# Patient Record
Sex: FEMALE | Race: WHITE
[De-identification: names, ages, dates, MRNs, and addresses within clinical notes are randomized per-mention and may not be internally consistent; named-entity substitution may affect disease eponyms.]

---

## 2020-12-09 ENCOUNTER — TRANSCRIPTION ENCOUNTER (OUTPATIENT)
Age: 75
End: 2020-12-09

## 2022-05-27 ENCOUNTER — NON-APPOINTMENT (OUTPATIENT)
Age: 77
End: 2022-05-27

## 2022-10-03 ENCOUNTER — NON-APPOINTMENT (OUTPATIENT)
Age: 77
End: 2022-10-03

## 2023-03-14 ENCOUNTER — NON-APPOINTMENT (OUTPATIENT)
Age: 78
End: 2023-03-14

## 2023-10-04 ENCOUNTER — NON-APPOINTMENT (OUTPATIENT)
Age: 78
End: 2023-10-04

## 2024-11-06 ENCOUNTER — APPOINTMENT (OUTPATIENT)
Dept: ELECTROPHYSIOLOGY | Facility: CLINIC | Age: 79
End: 2024-11-06
Payer: MEDICARE

## 2024-11-06 VITALS
WEIGHT: 226 LBS | HEIGHT: 68 IN | SYSTOLIC BLOOD PRESSURE: 140 MMHG | HEART RATE: 78 BPM | DIASTOLIC BLOOD PRESSURE: 82 MMHG | BODY MASS INDEX: 34.25 KG/M2

## 2024-11-06 DIAGNOSIS — I48.19 OTHER PERSISTENT ATRIAL FIBRILLATION: ICD-10-CM

## 2024-11-06 PROCEDURE — 93000 ELECTROCARDIOGRAM COMPLETE: CPT

## 2024-11-06 PROCEDURE — 99205 OFFICE O/P NEW HI 60 MIN: CPT

## 2024-11-23 ENCOUNTER — NON-APPOINTMENT (OUTPATIENT)
Age: 79
End: 2024-11-23

## 2024-12-14 ENCOUNTER — NON-APPOINTMENT (OUTPATIENT)
Age: 79
End: 2024-12-14

## 2024-12-16 ENCOUNTER — NON-APPOINTMENT (OUTPATIENT)
Age: 79
End: 2024-12-16

## 2025-01-07 ENCOUNTER — RESULT REVIEW (OUTPATIENT)
Age: 80
End: 2025-01-07

## 2025-01-07 ENCOUNTER — TRANSCRIPTION ENCOUNTER (OUTPATIENT)
Age: 80
End: 2025-01-07

## 2025-01-07 ENCOUNTER — OUTPATIENT (OUTPATIENT)
Dept: OUTPATIENT SERVICES | Facility: HOSPITAL | Age: 80
LOS: 1 days | Discharge: HOME | End: 2025-01-07
Payer: MEDICARE

## 2025-01-07 VITALS
HEART RATE: 75 BPM | OXYGEN SATURATION: 98 % | DIASTOLIC BLOOD PRESSURE: 87 MMHG | SYSTOLIC BLOOD PRESSURE: 160 MMHG | RESPIRATION RATE: 16 BRPM

## 2025-01-07 VITALS
TEMPERATURE: 98 F | DIASTOLIC BLOOD PRESSURE: 103 MMHG | HEART RATE: 100 BPM | WEIGHT: 218.04 LBS | SYSTOLIC BLOOD PRESSURE: 162 MMHG | HEIGHT: 67 IN | RESPIRATION RATE: 16 BRPM | OXYGEN SATURATION: 99 %

## 2025-01-07 DIAGNOSIS — I48.20 CHRONIC ATRIAL FIBRILLATION, UNSPECIFIED: ICD-10-CM

## 2025-01-07 DIAGNOSIS — I48.91 UNSPECIFIED ATRIAL FIBRILLATION: ICD-10-CM

## 2025-01-07 DIAGNOSIS — Z98.890 OTHER SPECIFIED POSTPROCEDURAL STATES: Chronic | ICD-10-CM

## 2025-01-07 LAB
ANION GAP SERPL CALC-SCNC: 11 MMOL/L — SIGNIFICANT CHANGE UP (ref 5–17)
BUN SERPL-MCNC: 13.8 MG/DL — SIGNIFICANT CHANGE UP (ref 8–20)
CALCIUM SERPL-MCNC: 9.8 MG/DL — SIGNIFICANT CHANGE UP (ref 8.4–10.5)
CHLORIDE SERPL-SCNC: 105 MMOL/L — SIGNIFICANT CHANGE UP (ref 96–108)
CO2 SERPL-SCNC: 24 MMOL/L — SIGNIFICANT CHANGE UP (ref 22–29)
CREAT SERPL-MCNC: 0.62 MG/DL — SIGNIFICANT CHANGE UP (ref 0.5–1.3)
EGFR: 91 ML/MIN/1.73M2 — SIGNIFICANT CHANGE UP
GLUCOSE SERPL-MCNC: 108 MG/DL — HIGH (ref 70–99)
HCT VFR BLD CALC: 44.2 % — SIGNIFICANT CHANGE UP (ref 34.5–45)
HGB BLD-MCNC: 14.7 G/DL — SIGNIFICANT CHANGE UP (ref 11.5–15.5)
MAGNESIUM SERPL-MCNC: 2.3 MG/DL — SIGNIFICANT CHANGE UP (ref 1.6–2.6)
MCHC RBC-ENTMCNC: 29.9 PG — SIGNIFICANT CHANGE UP (ref 27–34)
MCHC RBC-ENTMCNC: 33.3 G/DL — SIGNIFICANT CHANGE UP (ref 32–36)
MCV RBC AUTO: 90 FL — SIGNIFICANT CHANGE UP (ref 80–100)
PLATELET # BLD AUTO: 247 K/UL — SIGNIFICANT CHANGE UP (ref 150–400)
POTASSIUM SERPL-MCNC: 4.4 MMOL/L — SIGNIFICANT CHANGE UP (ref 3.5–5.3)
POTASSIUM SERPL-SCNC: 4.4 MMOL/L — SIGNIFICANT CHANGE UP (ref 3.5–5.3)
RBC # BLD: 4.91 M/UL — SIGNIFICANT CHANGE UP (ref 3.8–5.2)
RBC # FLD: 14.4 % — SIGNIFICANT CHANGE UP (ref 10.3–14.5)
SODIUM SERPL-SCNC: 140 MMOL/L — SIGNIFICANT CHANGE UP (ref 135–145)
WBC # BLD: 9.49 K/UL — SIGNIFICANT CHANGE UP (ref 3.8–10.5)
WBC # FLD AUTO: 9.49 K/UL — SIGNIFICANT CHANGE UP (ref 3.8–10.5)

## 2025-01-07 PROCEDURE — C8925: CPT

## 2025-01-07 PROCEDURE — 92960 CARDIOVERSION ELECTRIC EXT: CPT

## 2025-01-07 PROCEDURE — 99152 MOD SED SAME PHYS/QHP 5/>YRS: CPT

## 2025-01-07 PROCEDURE — 93320 DOPPLER ECHO COMPLETE: CPT

## 2025-01-07 PROCEDURE — 80048 BASIC METABOLIC PNL TOTAL CA: CPT

## 2025-01-07 PROCEDURE — 83735 ASSAY OF MAGNESIUM: CPT

## 2025-01-07 PROCEDURE — 93010 ELECTROCARDIOGRAM REPORT: CPT | Mod: 76

## 2025-01-07 PROCEDURE — 93325 DOPPLER ECHO COLOR FLOW MAPG: CPT | Mod: 26

## 2025-01-07 PROCEDURE — 36415 COLL VENOUS BLD VENIPUNCTURE: CPT

## 2025-01-07 PROCEDURE — 85027 COMPLETE CBC AUTOMATED: CPT

## 2025-01-07 PROCEDURE — 93320 DOPPLER ECHO COMPLETE: CPT | Mod: 26

## 2025-01-07 PROCEDURE — 93325 DOPPLER ECHO COLOR FLOW MAPG: CPT

## 2025-01-07 PROCEDURE — 93312 ECHO TRANSESOPHAGEAL: CPT | Mod: 26

## 2025-01-07 PROCEDURE — 93005 ELECTROCARDIOGRAM TRACING: CPT

## 2025-01-07 RX ORDER — DILTIAZEM HYDROCHLORIDE 300 MG/1
1 CAPSULE, COATED, EXTENDED RELEASE ORAL
Refills: 0 | DISCHARGE

## 2025-01-07 RX ORDER — METOPROLOL TARTRATE 50 MG
1 TABLET ORAL
Refills: 0 | DISCHARGE

## 2025-01-07 RX ORDER — RIVAROXABAN 2.5 MG/1
1 TABLET, FILM COATED ORAL
Refills: 0 | DISCHARGE

## 2025-01-07 RX ORDER — ALPRAZOLAM 0.25 MG/1
1 TABLET ORAL
Refills: 0 | DISCHARGE

## 2025-01-07 NOTE — H&P PST ADULT - ASSESSMENT
79 year old female with HTN, hyperlipidemia, obesity BMI 34, osteoporosis, anxiety, and atrial fibrillation. She has had persistent AF with rapid rates, which has resulted in recent symptoms including exertional dyspnea with minimal physical exertion, and fatigue. She presents today for elective WESTON guided cardioversion as an initial strategy.     Confirms NPO > 8 hrs. Last Xarelto 20mg 1/6/24 PM     - iv heplock  - stat labs, ECG  - consent randell/ MD

## 2025-01-07 NOTE — DISCHARGE NOTE PROVIDER - NSDCCPTREATMENT_GEN_ALL_CORE_FT
PRINCIPAL PROCEDURE  Procedure: Echocardiogram, transesophageal, with external cardioversion  Findings and Treatment: -Take each dose of your anticoagulation medication (blood thinner) exactly as prescribed.   - Do not drive, operate heavy machinery or make important decisions for 24 hours following the procedure.  - You may resume all other activities the day after the procedure.  Call your doctor if:   - your rapid heart rhythm returns.  - you have any questions or concerns regarding the procedure.  If you experience increased difficulty breathing or chest pain, or if you faint or have dizzy spells, please seek immediate medical attention.

## 2025-01-07 NOTE — DISCHARGE NOTE PROVIDER - HOSPITAL COURSE
79 year old female with HTN, hyperlipidemia, obesity BMI 34, osteoporosis, anxiety, and atrial fibrillation. She has had persistent AF with rapid rates, which has resulted in recent symptoms including exertional dyspnea with minimal physical exertion, and fatigue. She presented electively and is now status post WESTON negative for DARY thrombus and uncomplicated DCCV with restoration of sinus rhythm. The patient was observed per post procedure protocol, then discharged home with a plan for outpatient follow up.

## 2025-01-07 NOTE — DISCHARGE NOTE PROVIDER - NSDCFUSCHEDAPPT_GEN_ALL_CORE_FT
Rajat Horowitz  [facility]  CARDIOLOGY [address] [facility]  Scheduled Appointment: 01/29/2025

## 2025-01-07 NOTE — H&P PST ADULT - NSICDXPASTMEDICALHX_GEN_ALL_CORE_FT
PAST MEDICAL HISTORY:  Atrial fibrillation     HTN (hypertension)     Hyperlipidemia     Obesity     Osteoporosis      PAST MEDICAL HISTORY:  Anxiety     Atrial fibrillation     CA skin, basal cell     HTN (hypertension)     Hyperlipidemia     Obesity     Osteoporosis

## 2025-01-07 NOTE — DISCHARGE NOTE PROVIDER - CARE PROVIDER_API CALL
Venu Srivastava  Cardiac Electrophysiology  [address] [place] [address]  Phone: [phone]  Fax: [phone]  Follow Up Time:

## 2025-01-07 NOTE — DISCHARGE NOTE PROVIDER - NSDCFUADDINST_GEN_ALL_CORE_FT
Follow up with Dr. Srivastava/Nedra Tubbs NP at [facility] in 2-4 weeks. Our office will contact you in 3-5 days to schedule this appointment. Please call [phone] with questions or concerns. Follow up with Dr. Srivastava/Nedra Tubbs NP at [facility] in 2-4 weeks. Our office will contact you in 3-5 days to schedule this appointment. Please call [phone] with questions or concerns.    You should schedule an appointment with a pulmonologist to have a sleep study to rule out sleep apnea.

## 2025-01-07 NOTE — DISCHARGE NOTE PROVIDER - NSDCMRMEDTOKEN_GEN_ALL_CORE_FT
ALPRAZolam 0.25 mg oral tablet: 1 tab(s) orally once a day as needed for  anxiety  Diltia  mg/24 hours oral capsule, extended release: 1 cap(s) orally once a day  metoprolol succinate 200 mg oral capsule, extended release: 1 cap(s) orally once a day  Xarelto 20 mg oral tablet: 1 tab(s) orally once a day   ALPRAZolam 0.25 mg oral tablet: 1 tab(s) orally once a day as needed for  anxiety  metoprolol succinate 200 mg oral capsule, extended release: 1 cap(s) orally once a day  Xarelto 20 mg oral tablet: 1 tab(s) orally once a day

## 2025-01-07 NOTE — PROGRESS NOTE ADULT - SUBJECTIVE AND OBJECTIVE BOX
Pt doing well s/p uncomplicated WESTON & DCCV, 360J x 1 with restoration of sinus rhythm. Denies complaint post procedure.     PAST MEDICAL & SURGICAL HISTORY:  HTN (hypertension)  Hyperlipidemia  Atrial fibrillation  Obesity  Osteoporosis  Anxiety  CA skin, basal cell  Status post Mohs surgery    Post-procedure VS: 136/60 HR 77 O2 sat 97% RR 18   awake, alert, no obvious distress   Skin: no erythema/edema/blistering at defib pad sites.   Card: S1/S2, RRR, no m/g/r  Resp: lungs CTA b/l  Abd: S/NT/ND  Ext: no edema, distal pulses intact    WESTON: 1/7/25:   CONCLUSIONS:   1. Left ventricular cavity is normal in size. Left ventricular wall thickness is normal. Left ventricular systolic function is normal with an ejection fraction visually estimated at 55 to 60 %.   2. Left atrium is severely dilated.   3. Right atrium was not well visualized.   4. Tricuspid valve was not well visualized.   5. No pericardial effusion seen.   6. Blunted pulmonary venous velocity consistent with elevated left atrial pressure.   7. No evidence of left atrial or left atrial appendage thrombus. The left atrial appendage emptying velocity is low. Ultrasound enhancing agent was utilized to visualize the left atrial appendage.   8. The attending physician was present for the entire procedure.   9. The interatrial septum appears intact.  10. The patient was successfully cardioverted to sinus rhythm.  11. There is mild calcification of the aortic valve leaflets. No aortic valve stenosis.  12. There were no complications from the cardioversion procedure.  13. Trace aortic regurgitation.    Assessment:   79 year old female with HTN, hyperlipidemia, obesity BMI 34, osteoporosis, anxiety, and atrial fibrillation. She has had persistent AF with rapid rates, which has resulted in recent symptoms including exertional dyspnea with minimal physical exertion, and fatigue. She presented electively and is now status post WESTON negative for DARY thrombus and uncomplicated DCCV with restoration of sinus rhythm.     Plan:   Observation on telemetry per post op protocol.    Resume PO intake.   Ambulate w/ assist once fully awake & back to baseline mental status w/ VSS.  Continue Xarelto 20mg daily w/ dinner. Importance of strict compliance with anticoagulation regimen reinforced with pt.   Continue other home medications.   Outpatient catheter ablation of AF scheduled.   Anticipate d/c home once all criteria met, with outpt f/up in 1 month.    Pt doing well s/p uncomplicated WESTON & DCCV, 360J x 1 with restoration of sinus rhythm. Denies complaint post procedure.     PAST MEDICAL & SURGICAL HISTORY:  HTN (hypertension)  Hyperlipidemia  Atrial fibrillation  Obesity  Osteoporosis  Anxiety  CA skin, basal cell  Status post Mohs surgery    Post-procedure VS: 136/60 HR 77 O2 sat 97% RR 18   awake, alert, no obvious distress   Skin: no erythema/edema/blistering at defib pad sites.   Card: S1/S2, RRR, no m/g/r  Resp: lungs CTA b/l  Abd: S/NT/ND  Ext: no edema, distal pulses intact    WESTON: 1/7/25:   CONCLUSIONS:   1. Left ventricular cavity is normal in size. Left ventricular wall thickness is normal. Left ventricular systolic function is normal with an ejection fraction visually estimated at 55 to 60 %.   2. Left atrium is severely dilated.   3. Right atrium was not well visualized.   4. Tricuspid valve was not well visualized.   5. No pericardial effusion seen.   6. Blunted pulmonary venous velocity consistent with elevated left atrial pressure.   7. No evidence of left atrial or left atrial appendage thrombus. The left atrial appendage emptying velocity is low. Ultrasound enhancing agent was utilized to visualize the left atrial appendage.   8. The attending physician was present for the entire procedure.   9. The interatrial septum appears intact.  10. The patient was successfully cardioverted to sinus rhythm.  11. There is mild calcification of the aortic valve leaflets. No aortic valve stenosis.  12. There were no complications from the cardioversion procedure.  13. Trace aortic regurgitation.    Assessment:   79 year old female with HTN, hyperlipidemia, obesity BMI 34, osteoporosis, anxiety, and atrial fibrillation. She has had persistent AF with rapid rates, which has resulted in recent symptoms including exertional dyspnea with minimal physical exertion, and fatigue. She presented electively and is now status post WESTON negative for DARY thrombus and uncomplicated DCCV with restoration of sinus rhythm.     Plan:   Observation on telemetry per post op protocol.    Resume PO intake.   Ambulate w/ assist once fully awake & back to baseline mental status w/ VSS.  Continue Xarelto 20mg daily w/ dinner. Importance of strict compliance with anticoagulation regimen reinforced with pt.   Discontinue diltiazem.   Continue other home medications.   Outpatient catheter ablation of AF scheduled.   Anticipate d/c home once all criteria met, with outpt f/up in 1 month.

## 2025-01-07 NOTE — DISCHARGE NOTE NURSING/CASE MANAGEMENT/SOCIAL WORK - PATIENT PORTAL LINK FT
You can access the FollowMyHealth Patient Portal offered by [facility] by registering at the following website: [email or web address]. By joining [facility]’s FollowMyHealth portal, you will also be able to view your health information using other applications (apps) compatible with our system.

## 2025-01-07 NOTE — DISCHARGE NOTE NURSING/CASE MANAGEMENT/SOCIAL WORK - FINANCIAL ASSISTANCE
[facility] provides services at a reduced cost to those who are determined to be eligible through [facility]’s financial assistance program. Information regarding [facility]’s financial assistance program can be found by going to [email or web address] or by calling [phone].

## 2025-01-07 NOTE — H&P PST ADULT - HISTORY OF PRESENT ILLNESS
79 year old female with HTN, hyperlipidemia, obesity, osteoporosis, and atrial fibrillation. She has had persistent AF with rapid rates, which has resulted in recent symptoms including exertional dyspnea with minimal physical exertion, and fatigue. She presents today for elective WESTON guided cardioversion as an initial strategy.     Cardiology summary:   TTE: 10/22/24: LVEF 50-53%, mild pulm HTN, LA 5cm, mild-mod MR, mild-mod TR, RVSP 40      79 year old female with HTN, hyperlipidemia, obesity BMI 34, osteoporosis, anxiety, and atrial fibrillation. She has had persistent AF with rapid rates, which has resulted in recent symptoms including exertional dyspnea with minimal physical exertion, and fatigue. She presents today for elective WESTON guided cardioversion as an initial strategy.     Cardiology summary:   TTE: 10/22/24: LVEF 50-53%, mild pulm HTN, LA 5cm, mild-mod MR, mild-mod TR, RVSP 40

## 2025-01-28 PROBLEM — I10 ESSENTIAL (PRIMARY) HYPERTENSION: Chronic | Status: ACTIVE | Noted: 2025-01-07

## 2025-01-28 PROBLEM — M81.0 AGE-RELATED OSTEOPOROSIS WITHOUT CURRENT PATHOLOGICAL FRACTURE: Chronic | Status: ACTIVE | Noted: 2025-01-07

## 2025-01-28 PROBLEM — C44.91 BASAL CELL CARCINOMA OF SKIN, UNSPECIFIED: Chronic | Status: ACTIVE | Noted: 2025-01-07

## 2025-01-28 PROBLEM — E66.9 OBESITY, UNSPECIFIED: Chronic | Status: ACTIVE | Noted: 2025-01-07

## 2025-01-28 PROBLEM — F41.9 ANXIETY DISORDER, UNSPECIFIED: Chronic | Status: ACTIVE | Noted: 2025-01-07

## 2025-01-28 PROBLEM — E78.5 HYPERLIPIDEMIA, UNSPECIFIED: Chronic | Status: ACTIVE | Noted: 2025-01-07

## 2025-01-29 ENCOUNTER — APPOINTMENT (OUTPATIENT)
Dept: CARDIOLOGY | Facility: CLINIC | Age: 80
End: 2025-01-29

## 2025-02-26 ENCOUNTER — APPOINTMENT (OUTPATIENT)
Dept: ELECTROPHYSIOLOGY | Facility: CLINIC | Age: 80
End: 2025-02-26
Payer: MEDICARE

## 2025-02-26 ENCOUNTER — OUTPATIENT (OUTPATIENT)
Dept: OUTPATIENT SERVICES | Facility: HOSPITAL | Age: 80
LOS: 1 days | End: 2025-02-26
Payer: MEDICARE

## 2025-02-26 VITALS
TEMPERATURE: 98 F | OXYGEN SATURATION: 98 % | WEIGHT: 216.05 LBS | HEIGHT: 67 IN | DIASTOLIC BLOOD PRESSURE: 75 MMHG | HEART RATE: 67 BPM | RESPIRATION RATE: 16 BRPM | SYSTOLIC BLOOD PRESSURE: 140 MMHG

## 2025-02-26 VITALS — HEIGHT: 68 IN | HEART RATE: 66 BPM

## 2025-02-26 DIAGNOSIS — Z98.890 OTHER SPECIFIED POSTPROCEDURAL STATES: Chronic | ICD-10-CM

## 2025-02-26 DIAGNOSIS — Z01.818 ENCOUNTER FOR OTHER PREPROCEDURAL EXAMINATION: ICD-10-CM

## 2025-02-26 DIAGNOSIS — I48.0 PAROXYSMAL ATRIAL FIBRILLATION: ICD-10-CM

## 2025-02-26 DIAGNOSIS — Z92.89 PERSONAL HISTORY OF OTHER MEDICAL TREATMENT: ICD-10-CM

## 2025-02-26 LAB
ALBUMIN SERPL ELPH-MCNC: 4.3 G/DL — SIGNIFICANT CHANGE UP (ref 3.3–5.2)
ALP SERPL-CCNC: 92 U/L — SIGNIFICANT CHANGE UP (ref 40–120)
ALT FLD-CCNC: 13 U/L — SIGNIFICANT CHANGE UP
ANION GAP SERPL CALC-SCNC: 14 MMOL/L — SIGNIFICANT CHANGE UP (ref 5–17)
APTT BLD: 35.7 SEC — HIGH (ref 24.5–35.6)
AST SERPL-CCNC: 21 U/L — SIGNIFICANT CHANGE UP
BASOPHILS # BLD AUTO: 0.03 K/UL — SIGNIFICANT CHANGE UP (ref 0–0.2)
BASOPHILS NFR BLD AUTO: 0.4 % — SIGNIFICANT CHANGE UP (ref 0–2)
BILIRUB SERPL-MCNC: 0.5 MG/DL — SIGNIFICANT CHANGE UP (ref 0.4–2)
BLD GP AB SCN SERPL QL: SIGNIFICANT CHANGE UP
BUN SERPL-MCNC: 25.2 MG/DL — HIGH (ref 8–20)
CALCIUM SERPL-MCNC: 10.1 MG/DL — SIGNIFICANT CHANGE UP (ref 8.4–10.5)
CHLORIDE SERPL-SCNC: 100 MMOL/L — SIGNIFICANT CHANGE UP (ref 96–108)
CHOLEST SERPL-MCNC: 226 MG/DL — HIGH
CO2 SERPL-SCNC: 24 MMOL/L — SIGNIFICANT CHANGE UP (ref 22–29)
CREAT SERPL-MCNC: 0.91 MG/DL — SIGNIFICANT CHANGE UP (ref 0.5–1.3)
EGFR: 65 ML/MIN/1.73M2 — SIGNIFICANT CHANGE UP
EOSINOPHIL # BLD AUTO: 0.1 K/UL — SIGNIFICANT CHANGE UP (ref 0–0.5)
EOSINOPHIL NFR BLD AUTO: 1.2 % — SIGNIFICANT CHANGE UP (ref 0–6)
GLUCOSE SERPL-MCNC: 103 MG/DL — HIGH (ref 70–99)
HCT VFR BLD CALC: 41.8 % — SIGNIFICANT CHANGE UP (ref 34.5–45)
HDLC SERPL-MCNC: 60 MG/DL — SIGNIFICANT CHANGE UP
HGB BLD-MCNC: 14.1 G/DL — SIGNIFICANT CHANGE UP (ref 11.5–15.5)
IMM GRANULOCYTES # BLD AUTO: 0.01 K/UL — SIGNIFICANT CHANGE UP (ref 0–0.07)
IMM GRANULOCYTES NFR BLD AUTO: 0.1 % — SIGNIFICANT CHANGE UP (ref 0–0.9)
INR BLD: 1.35 RATIO — HIGH (ref 0.85–1.16)
LIPID PNL WITH DIRECT LDL SERPL: 153 MG/DL — HIGH
LYMPHOCYTES # BLD AUTO: 2.86 K/UL — SIGNIFICANT CHANGE UP (ref 1–3.3)
LYMPHOCYTES NFR BLD AUTO: 35.4 % — SIGNIFICANT CHANGE UP (ref 13–44)
MAGNESIUM SERPL-MCNC: 2.4 MG/DL — SIGNIFICANT CHANGE UP (ref 1.6–2.6)
MCHC RBC-ENTMCNC: 30.5 PG — SIGNIFICANT CHANGE UP (ref 27–34)
MCHC RBC-ENTMCNC: 33.7 G/DL — SIGNIFICANT CHANGE UP (ref 32–36)
MCV RBC AUTO: 90.3 FL — SIGNIFICANT CHANGE UP (ref 80–100)
MONOCYTES # BLD AUTO: 0.89 K/UL — SIGNIFICANT CHANGE UP (ref 0–0.9)
MONOCYTES NFR BLD AUTO: 11 % — SIGNIFICANT CHANGE UP (ref 2–14)
NEUTROPHILS # BLD AUTO: 4.19 K/UL — SIGNIFICANT CHANGE UP (ref 1.8–7.4)
NEUTROPHILS NFR BLD AUTO: 51.9 % — SIGNIFICANT CHANGE UP (ref 43–77)
NON HDL CHOLESTEROL: 166 MG/DL — HIGH
NRBC # BLD AUTO: 0 K/UL — SIGNIFICANT CHANGE UP (ref 0–0)
NRBC # FLD: 0 K/UL — SIGNIFICANT CHANGE UP (ref 0–0)
NRBC BLD AUTO-RTO: 0 /100 WBCS — SIGNIFICANT CHANGE UP (ref 0–0)
PLATELET # BLD AUTO: 231 K/UL — SIGNIFICANT CHANGE UP (ref 150–400)
PMV BLD: 12.3 FL — SIGNIFICANT CHANGE UP (ref 7–13)
POTASSIUM SERPL-MCNC: 4.6 MMOL/L — SIGNIFICANT CHANGE UP (ref 3.5–5.3)
POTASSIUM SERPL-SCNC: 4.6 MMOL/L — SIGNIFICANT CHANGE UP (ref 3.5–5.3)
PROT SERPL-MCNC: 7.7 G/DL — SIGNIFICANT CHANGE UP (ref 6.6–8.7)
PROTHROM AB SERPL-ACNC: 15.2 SEC — HIGH (ref 9.9–13.4)
RBC # BLD: 4.63 M/UL — SIGNIFICANT CHANGE UP (ref 3.8–5.2)
RBC # FLD: 13.2 % — SIGNIFICANT CHANGE UP (ref 10.3–14.5)
SODIUM SERPL-SCNC: 138 MMOL/L — SIGNIFICANT CHANGE UP (ref 135–145)
TRIGL SERPL-MCNC: 77 MG/DL — SIGNIFICANT CHANGE UP
WBC # BLD: 8.08 K/UL — SIGNIFICANT CHANGE UP (ref 3.8–10.5)
WBC # FLD AUTO: 8.08 K/UL — SIGNIFICANT CHANGE UP (ref 3.8–10.5)

## 2025-02-26 PROCEDURE — G0463: CPT

## 2025-02-26 PROCEDURE — 99215 OFFICE O/P EST HI 40 MIN: CPT

## 2025-02-26 PROCEDURE — 93000 ELECTROCARDIOGRAM COMPLETE: CPT

## 2025-02-26 PROCEDURE — 86901 BLOOD TYPING SEROLOGIC RH(D): CPT

## 2025-02-26 PROCEDURE — 93010 ELECTROCARDIOGRAM REPORT: CPT

## 2025-02-26 PROCEDURE — 36415 COLL VENOUS BLD VENIPUNCTURE: CPT

## 2025-02-26 PROCEDURE — 80053 COMPREHEN METABOLIC PANEL: CPT

## 2025-02-26 PROCEDURE — 86850 RBC ANTIBODY SCREEN: CPT

## 2025-02-26 PROCEDURE — 85610 PROTHROMBIN TIME: CPT

## 2025-02-26 PROCEDURE — 93005 ELECTROCARDIOGRAM TRACING: CPT

## 2025-02-26 PROCEDURE — 85025 COMPLETE CBC W/AUTO DIFF WBC: CPT

## 2025-02-26 PROCEDURE — 83735 ASSAY OF MAGNESIUM: CPT

## 2025-02-26 PROCEDURE — 86900 BLOOD TYPING SEROLOGIC ABO: CPT

## 2025-02-26 PROCEDURE — 80061 LIPID PANEL: CPT

## 2025-02-26 PROCEDURE — 85730 THROMBOPLASTIN TIME PARTIAL: CPT

## 2025-02-26 NOTE — H&P PST ADULT - NSICDXPASTMEDICALHX_GEN_ALL_CORE_FT
PAST MEDICAL HISTORY:  Anxiety     Atrial fibrillation     CA skin, basal cell     HTN (hypertension)     Hyperlipidemia     Obesity     Osteoporosis

## 2025-02-26 NOTE — H&P PST ADULT - HISTORY OF PRESENT ILLNESS
Department of Cardiology                                                                  [facility]/[place] [address] [place] [id number]                                                            Telephone: [phone]. Fax:[phone]                                                                                    PST H & P     HPI: 79 year old woman with history of HTN, HLD, obesity, osteoporosis, presenting for evaluation of atrial fibrillation s/p WESTON/DCCV on 25.  ?  She has been feeling dyspnea and LH with minimal exertion and fatigue for a few months. She denies palpitation, dizziness, syncope or near syncope.  On evaluation with per PMD, she was noted to be in AF with RVR during evaluation with PMD. ECG 24 showed AF with rate 99 bpm. She was started on Xarelto and her metoprolol dose was increased.  On followup she continued to have RVR and metoprolol was increased to 200mg, and cardizem has been started as well, s/p  WESTON/DCCV 2025 and now presents to PST for upcoming Afib ablation with Dr. Srivastava   ?  ?  She is s/p WESTON/DCCV on 25      Cardiology Summary   EC25:  2024: AF 78 bpm    Echo: WESTON 25L LVEF 55-60%  TTE 10/22/24 LVEF 50-53% LA 5cm, mild-mod MR, mild-mod TR, RVSP 40, mild-mod MR, physiologic PI, mild pulm HTN, mild-mod TR  WESTON/DCCV   CONCLUSIONS:      1. Left ventricular cavity is normal in size. Left ventricular wall thickness is normal. Left ventricular systolic function is normal with an ejection fraction visually estimated at 55 to 60 %.   2. Left atrium is severely dilated.   3. Right atrium was not well visualized.   4. Tricuspid valve was not well visualized.   5. No pericardial effusion seen.   6. Blunted pulmonary venous velocity consistent with elevated left atrial pressure.   7. No evidence of left atrial or left atrial appendage thrombus. The left atrial appendage emptying velocity is low. Ultrasound enhancing agent was utilized to visualize the left atrial appendage.   8. The attending physician was present for the entire procedure.   9. The interatrial septum appears intact.  10. The patient was successfully cardioverted to sinus rhythm.  11. There is mild calcification of the aortic valve leaflets. No aortic valve stenosis.  12. There were no complications from the cardioversion procedure.  13. Trace aortic regurgitation.    ____________________________________________________________________  WESTON Procedure:  Study was performed with anesthesia (please see anesthesia record).  The adult Fiona WESTON probe was introduced and passed into the esophagus. The WESTON probe was passed without difficulty. Images were obtained with the patient in a left lateral decubitus position. Image quality was good. The patient's vital signs;including heart rate, blood pressure, and oxygen saturation; remained stable throughout the procedure. The patient tolerated the procedure well and without complications.  ________________________________________________________________________________________  Cardioversion Procedure:  Written, informed consent to proceed with cardioversion was obtained prior to administering conscious sedation. After the transesophageal echocardiogram demonstrated no left atrial appendage thrombus, it was decided to proceed with cardioversion. The patient was preoxygenated with oxygen by face mask. The patient was monitored throughout the procedure by a nurse. The patient was shocked at 360 J on a biphasic defibrillator. The patient was successfully cardioverted to sinus rhythm. There were no complications from the cardioversion procedure. The attending physician was present for the entire procedure.       ?    Antiarrhythmic Therapies:      Anticoagulation Therapies:                ROS: as stated above, otherwise negative    PHYSICAL EXAM:  Constitutional: A & O x 3  HEENT:   Normal oral mucosa, PERRL, EOMI	  Cardiovascular: Normal S1 S2, No JVD, *****No murmurs  Respiratory: Lungs clear to auscultation	****  Gastrointestinal:  Soft, Non-tender, + BS	  Skin: No rashes, No ecchymoses, No cyanosis  Neurologic: Non-focal  Extremities: Normal range of motion, no edema****  Vascular: Peripheral pulses palpable + bilaterally ****    ECG:  	    LABS:	 	                        HPI:      ASA:  MALLAMPATI:  BRA:  GFR:  Creatinine:    Plan/Recommendations:   -plan for **** on ***  -patient seen and examined  -ECG and Labs reviewed  -NPO after midnight prior with exception of sip of water with morning medications  -Continue/Hold antiarrhythmics ................................  -Pre-procedure instructions provided (verbal & written)   -Supplies and verbal/written Instructions for pre-surgical chlorhexadine shower provided*****  -Consent to be obtained by attending electrophysiologist on the scheduled procedure date                                                                                         Department of Cardiology                                                                  [facility]/[place] [address] [address] [facility]                                                            Telephone: [phone]. Fax:[phone]                                                                                    PST H & P     HPI: 79 year old woman with history of HTN, HLD, obesity, osteoporosis, and Atrial Fibrillation. First diagonsd by PMD, she was noted to be in AF with RVR during evaluation with PMD. ECG 24 showed AF with rate 99 bpm. She was started on Xarelto and her metoprolol dose was increased. She is s/p  WESTON/DCCV 2025 and now presents to PST for upcoming Afib ablation  on 3/4/2025 with Dr. Srivastava.  She has been feeling dyspnea and LH with minimal exertion and fatigue for a few months. She denies palpitation, dizziness, syncope or near syncope at this time feels great since cardioversion.   ?  ?  She is s/p WESTON/DCCV on 25      Cardiology Summary   EC25:  2024: AF 78 bpm    Echo: WESTON 25L LVEF 55-60%  TTE 10/22/24 LVEF 50-53% LA 5cm, mild-mod MR, mild-mod TR, RVSP 40, mild-mod MR, physiologic PI, mild pulm HTN, mild-mod TR  WESTON/DCCV   CONCLUSIONS:      1. Left ventricular cavity is normal in size. Left ventricular wall thickness is normal. Left ventricular systolic function is normal with an ejection fraction visually estimated at 55 to 60 %.   2. Left atrium is severely dilated.   3. Right atrium was not well visualized.   4. Tricuspid valve was not well visualized.   5. No pericardial effusion seen.   6. Blunted pulmonary venous velocity consistent with elevated left atrial pressure.   7. No evidence of left atrial or left atrial appendage thrombus. The left atrial appendage emptying velocity is low. Ultrasound enhancing agent was utilized to visualize the left atrial appendage.   8. The attending physician was present for the entire procedure.   9. The interatrial septum appears intact.  10. The patient was successfully cardioverted to sinus rhythm.  11. There is mild calcification of the aortic valve leaflets. No aortic valve stenosis.  12. There were no complications from the cardioversion procedure.  13. Trace aortic regurgitation.    ____________________________________________________________________  WESTON Procedure:  Study was performed with anesthesia (please see anesthesia record).  The adult Fiona WESTON probe was introduced and passed into the esophagus. The WESTON probe was passed without difficulty. Images were obtained with the patient in a left lateral decubitus position. Image quality was good. The patient's vital signs;including heart rate, blood pressure, and oxygen saturation; remained stable throughout the procedure. The patient tolerated the procedure well and without complications.  ________________________________________________________________________________________  Cardioversion Procedure:  Written, informed consent to proceed with cardioversion was obtained prior to administering conscious sedation. After the transesophageal echocardiogram demonstrated no left atrial appendage thrombus, it was decided to proceed with cardioversion. The patient was preoxygenated with oxygen by face mask. The patient was monitored throughout the procedure by a nurse. The patient was shocked at 360 J on a biphasic defibrillator. The patient was successfully cardioverted to sinus rhythm. There were no complications from the cardioversion procedure. The attending physician was present for the entire procedure.       ?       Anticoagulation Therapies:  Xarelto       ROS: as stated above, otherwise negative    PHYSICAL EXAM:  Constitutional: A & O x 3  HEENT:   Normal oral mucosa, PERRL, EOMI	  Cardiovascular: Normal S1 S2, No JVD, 2/6 CANDIDA LSB murmurs  Respiratory: Lungs clear to auscultation	  Gastrointestinal:  Soft, Non-tender, + BS	  Skin: No rashes, No ecchymoses, No cyanosis  Neurologic: Non-focal  Extremities: Normal range of motion, no edema  Vascular: Peripheral pulses palpable + bilaterally     ECbpm NSR     LABS:	 	                                                                                             Department of Cardiology                                                                  [facility]/[place] [address] [address] [facility]                                                            Telephone: [phone]. Fax:[phone]                                                                                    PST H & P     HPI: 79 year old woman with history of HTN, HLD, obesity, osteoporosis, and Atrial Fibrillation. First diagonsd by PMD, she was noted to be in AF with RVR during evaluation with PMD. ECG 24 showed AF with rate 99 bpm. She was started on Xarelto and her metoprolol dose was increased. She is s/p  WESTON/DCCV 2025 and now presents to PST for upcoming Afib ablation  on 3/4/2025 with Dr. Srivastava.  She has been feeling dyspnea and LH with minimal exertion and fatigue for a few months. She denies palpitation, dizziness, syncope or near syncope at this time feels great since cardioversion.   ?  ?  She is s/p WESTON/DCCV on 25      Cardiology Summary   EC25:  2024: AF 78 bpm    Echo: WESTON 25L LVEF 55-60%  TTE 10/22/24 LVEF 50-53% LA 5cm, mild-mod MR, mild-mod TR, RVSP 40, mild-mod MR, physiologic PI, mild pulm HTN, mild-mod TR  WESTON/DCCV   CONCLUSIONS:      1. Left ventricular cavity is normal in size. Left ventricular wall thickness is normal. Left ventricular systolic function is normal with an ejection fraction visually estimated at 55 to 60 %.   2. Left atrium is severely dilated.   3. Right atrium was not well visualized.   4. Tricuspid valve was not well visualized.   5. No pericardial effusion seen.   6. Blunted pulmonary venous velocity consistent with elevated left atrial pressure.   7. No evidence of left atrial or left atrial appendage thrombus. The left atrial appendage emptying velocity is low. Ultrasound enhancing agent was utilized to visualize the left atrial appendage.   8. The attending physician was present for the entire procedure.   9. The interatrial septum appears intact.  10. The patient was successfully cardioverted to sinus rhythm.  11. There is mild calcification of the aortic valve leaflets. No aortic valve stenosis.  12. There were no complications from the cardioversion procedure.  13. Trace aortic regurgitation.    ____________________________________________________________________  WESTON Procedure:  Study was performed with anesthesia (please see anesthesia record).  The adult Fiona WESTON probe was introduced and passed into the esophagus. The WESTON probe was passed without difficulty. Images were obtained with the patient in a left lateral decubitus position. Image quality was good. The patient's vital signs;including heart rate, blood pressure, and oxygen saturation; remained stable throughout the procedure. The patient tolerated the procedure well and without complications.  ________________________________________________________________________________________  Cardioversion Procedure:  Written, informed consent to proceed with cardioversion was obtained prior to administering conscious sedation. After the transesophageal echocardiogram demonstrated no left atrial appendage thrombus, it was decided to proceed with cardioversion. The patient was preoxygenated with oxygen by face mask. The patient was monitored throughout the procedure by a nurse. The patient was shocked at 360 J on a biphasic defibrillator. The patient was successfully cardioverted to sinus rhythm. There were no complications from the cardioversion procedure. The attending physician was present for the entire procedure.       ?       Anticoagulation Therapies:  Xarelto       ROS: as stated above, otherwise negative    PHYSICAL EXAM:  Constitutional: A & O x 3  HEENT:   Normal oral mucosa, PERRL, EOMI	  Cardiovascular: Normal S1 S2, No JVD, 2/6 CANDIDA LSB murmurs  Respiratory: Lungs clear to auscultation	  Gastrointestinal:  Soft, Non-tender, + BS	  Skin: No rashes, No ecchymoses, No cyanosis  Neurologic: Non-focal  Extremities: Normal range of motion, no edema  Vascular: Peripheral pulses palpable + bilaterally     ECbpm NSR     LABS:	 	                          14.1   8.08  )-----------( 231      ( 2025 10:30 )             41.8       138  |  100  |  25.2[H]  ----------------------------<  103[H]  4.6   |  24.0  |  0.91    Ca    10.1      2025 10:30  Mg     2.4         TPro  7.7  /  Alb  4.3  /  TBili  0.5  /  DBili  x   /  AST  21  /  ALT  13  /  AlkPhos  92    PT/INR - ( 2025 10:30 )   PT: 15.2 sec;   INR: 1.35 ratio         PTT - ( 2025 10:30 )  PTT:35.7 sec

## 2025-02-26 NOTE — H&P PST ADULT - ASSESSMENT
Plan/Recommendations:   -plan for Afib ablation on 3/4/2025  -patient seen and examined  -ECG and Labs reviewed  -NPO after midnight prior with exception of sip of water with morning medications  -Hold Xarelto morning of procedure   -Pre-procedure instructions provided (verbal & written)   -Consent to be obtained by attending electrophysiologist on the scheduled procedure date

## 2025-03-04 ENCOUNTER — INPATIENT (INPATIENT)
Facility: HOSPITAL | Age: 80
LOS: 0 days | Discharge: HOME | DRG: 274 | End: 2025-03-05
Attending: STUDENT IN AN ORGANIZED HEALTH CARE EDUCATION/TRAINING PROGRAM | Admitting: STUDENT IN AN ORGANIZED HEALTH CARE EDUCATION/TRAINING PROGRAM
Payer: MEDICARE

## 2025-03-04 ENCOUNTER — TRANSCRIPTION ENCOUNTER (OUTPATIENT)
Age: 80
End: 2025-03-04

## 2025-03-04 VITALS
TEMPERATURE: 98 F | HEART RATE: 62 BPM | SYSTOLIC BLOOD PRESSURE: 156 MMHG | RESPIRATION RATE: 12 BRPM | DIASTOLIC BLOOD PRESSURE: 62 MMHG | OXYGEN SATURATION: 99 %

## 2025-03-04 DIAGNOSIS — Z98.890 OTHER SPECIFIED POSTPROCEDURAL STATES: Chronic | ICD-10-CM

## 2025-03-04 DIAGNOSIS — I48.91 UNSPECIFIED ATRIAL FIBRILLATION: ICD-10-CM

## 2025-03-04 LAB — ABO RH CONFIRMATION: SIGNIFICANT CHANGE UP

## 2025-03-04 PROCEDURE — 93657 TX L/R ATRIAL FIB ADDL: CPT

## 2025-03-04 PROCEDURE — 93656 COMPRE EP EVAL ABLTJ ATR FIB: CPT

## 2025-03-04 PROCEDURE — 93010 ELECTROCARDIOGRAM REPORT: CPT

## 2025-03-04 RX ORDER — MAGNESIUM, ALUMINUM HYDROXIDE 200-200 MG
30 TABLET,CHEWABLE ORAL EVERY 6 HOURS
Refills: 0 | Status: DISCONTINUED | OUTPATIENT
Start: 2025-03-04 | End: 2025-03-05

## 2025-03-04 RX ORDER — HYDROCHLOROTHIAZIDE 50 MG/1
1 TABLET ORAL
Refills: 0 | DISCHARGE

## 2025-03-04 RX ORDER — METOPROLOL SUCCINATE 50 MG/1
100 TABLET, EXTENDED RELEASE ORAL DAILY
Refills: 0 | Status: DISCONTINUED | OUTPATIENT
Start: 2025-03-04 | End: 2025-03-05

## 2025-03-04 RX ORDER — AMLODIPINE BESYLATE 10 MG/1
1 TABLET ORAL
Refills: 0 | DISCHARGE

## 2025-03-04 RX ORDER — ACETAMINOPHEN 500 MG/5ML
650 LIQUID (ML) ORAL EVERY 6 HOURS
Refills: 0 | Status: DISCONTINUED | OUTPATIENT
Start: 2025-03-04 | End: 2025-03-05

## 2025-03-04 RX ORDER — RIVAROXABAN 10 MG/1
20 TABLET, FILM COATED ORAL
Refills: 0 | Status: DISCONTINUED | OUTPATIENT
Start: 2025-03-04 | End: 2025-03-05

## 2025-03-04 RX ORDER — AMLODIPINE BESYLATE 10 MG/1
10 TABLET ORAL DAILY
Refills: 0 | Status: DISCONTINUED | OUTPATIENT
Start: 2025-03-04 | End: 2025-03-05

## 2025-03-04 RX ORDER — ALPRAZOLAM 0.5 MG
0.25 TABLET, EXTENDED RELEASE 24 HR ORAL EVERY 6 HOURS
Refills: 0 | Status: DISCONTINUED | OUTPATIENT
Start: 2025-03-04 | End: 2025-03-05

## 2025-03-04 RX ORDER — ONDANSETRON HCL/PF 4 MG/2 ML
4 VIAL (ML) INJECTION EVERY 6 HOURS
Refills: 0 | Status: DISCONTINUED | OUTPATIENT
Start: 2025-03-04 | End: 2025-03-05

## 2025-03-04 RX ADMIN — Medication 75 MILLILITER(S): at 16:26

## 2025-03-04 RX ADMIN — Medication 650 MILLIGRAM(S): at 23:46

## 2025-03-04 RX ADMIN — Medication 650 MILLIGRAM(S): at 22:46

## 2025-03-04 RX ADMIN — METOPROLOL SUCCINATE 100 MILLIGRAM(S): 50 TABLET, EXTENDED RELEASE ORAL at 22:46

## 2025-03-04 RX ADMIN — AMLODIPINE BESYLATE 10 MILLIGRAM(S): 10 TABLET ORAL at 22:46

## 2025-03-04 RX ADMIN — RIVAROXABAN 20 MILLIGRAM(S): 10 TABLET, FILM COATED ORAL at 19:53

## 2025-03-04 NOTE — DISCHARGE NOTE PROVIDER - NSDCFUADDINST_GEN_ALL_CORE_FT
Decrease Toprol to 100mg daily  Recommend outpatient post procedure follow-up in 1 month.  Follow up with Dr. Srivastava/Nedra Tubbs NP at [facility] in 2-4 weeks. Our office will contact you in 3-5 days to schedule this appointment. Please call [phone] with questions or concerns.

## 2025-03-04 NOTE — DISCHARGE NOTE PROVIDER - CARE PROVIDER_API CALL
Venu Srivastava  Cardiac Electrophysiology  [address] [place] [address]  Phone: [phone]  Fax: [phone]  Follow Up Time: 1 month

## 2025-03-04 NOTE — DISCHARGE NOTE PROVIDER - HOSPITAL COURSE
79 year old woman with HTN, HLD, obesity, osteoporosis, and Atrial Fibrillation (diagnosed 9/13/24 by PCP) s/p WESTON/DCCV 1/7/2025, with symptomatic improvement post cardioversion.   Now s/p uncomplicated pulsed field ablation of atrial fibrillation (PVI, PWI) via b/l femoral venous access.  The patient was observed overnight without event and was discharged home the following morning with a plan for outpatient follow up. 79 year old woman with HTN, HLD, obesity, osteoporosis, and Atrial Fibrillation (diagnosed 9/13/24 by PCP) s/p WESTON/DCCV 1/7/2025, with symptomatic improvement post cardioversion. Now s/p uncomplicated pulsed field ablation of atrial fibrillation (PVI, PWI) via b/l femoral venous access.  The patient was observed overnight without event and was discharged home the following morning with a plan for outpatient follow up.

## 2025-03-04 NOTE — ASU PATIENT PROFILE, ADULT - FALL HARM RISK - UNIVERSAL INTERVENTIONS
Bed in lowest position, wheels locked, appropriate side rails in place/Call bell, personal items and telephone in reach/Instruct patient to call for assistance before getting out of bed or chair/Non-slip footwear when patient is out of bed/[place] to call system/Physically safe environment - no spills, clutter or unnecessary equipment/Purposeful Proactive Rounding/Room/bathroom lighting operational, light cord in reach

## 2025-03-04 NOTE — DISCHARGE NOTE PROVIDER - NSDCMRMEDTOKEN_GEN_ALL_CORE_FT
hydroCHLOROthiazide 12.5 mg oral tablet: 1 tab(s) orally once a day  metoprolol succinate 200 mg oral capsule, extended release: 1 cap(s) orally once a day  Norvasc 10 mg oral tablet: 1 tab(s) orally once a day  Xarelto 20 mg oral tablet: 1 tab(s) orally once a day   hydroCHLOROthiazide 12.5 mg oral tablet: 1 tab(s) orally once a day  metoprolol succinate 100 mg oral tablet, extended release: 1 tab(s) orally once a day  Norvasc 10 mg oral tablet: 1 tab(s) orally once a day  Xarelto 20 mg oral tablet: 1 tab(s) orally once a day

## 2025-03-04 NOTE — PROGRESS NOTE ADULT - SUBJECTIVE AND OBJECTIVE BOX
79 year old woman with HTN, HLD, obesity, osteoporosis, and Atrial Fibrillation (diagnosed 9/13/24 by PCP) s/p WESTON/DCCV 1/7/2025, with symptomatic improvement post cardioversion. She now presents for AF ablation with Dr. Srivastava.     Labs and PST from 2/26 reviewed. Denies interval change****  Confirm NPO**  Last a/c dose    - IV heparin lock  - STAT type and screen  - 2 units of PRBC on hold  - Maintain NPO status for procedure  - Consent to be obtained by ADDISON GROVE       79 year old woman with HTN, HLD, obesity, osteoporosis, and Atrial Fibrillation (diagnosed 9/13/24 by PCP) s/p WESTON/DCCV 1/7/2025, with symptomatic improvement post cardioversion.   She now presents for AF ablation with Dr. Srivastava.     Labs and PST from 2/26 reviewed. Denies interval change.  Confirms NPO > 8 hours.   Last dose of ELiqus was yesterday evening.     - IV heparin lock  - STAT type and screen  - 2 units of PRBC on hold  - Maintain NPO status for procedure  - Consent to be obtained by ADDISON GROVE

## 2025-03-04 NOTE — PROGRESS NOTE ADULT - SUBJECTIVE AND OBJECTIVE BOX
PROCEDURE(S): Pulsed Field Ablation of Atrial Fibrillation    ELECTRPHYSIOLOGIST(S): Venu Srivastava MD         COMPLICATIONS:  none        DISPOSITION:  observation     CONDITION: stable  EBL: <15mL    Pt doing well s/p uncomplicated pulsed field ablation of atrial fibrillation (PVI, PWI) via b/l femoral venous access. Denies complaint.       MEDICATIONS  (STANDING):  amLODIPine   Tablet 10 milliGRAM(s) Oral daily  metoprolol succinate  milliGRAM(s) Oral daily  rivaroxaban 20 milliGRAM(s) Oral with dinner  sodium chloride 0.9%. 1000 milliLiter(s) (75 mL/Hr) IV Continuous <Continuous>    MEDICATIONS  (PRN):  acetaminophen     Tablet .. 650 milliGRAM(s) Oral every 6 hours PRN Mild Pain (1 - 3), Moderate Pain (4 - 6)  ALPRAZolam 0.25 milliGRAM(s) Oral every 6 hours PRN anxiety/insomnia  aluminum hydroxide/magnesium hydroxide/simethicone Suspension 30 milliLiter(s) Oral every 6 hours PRN Dyspepsia  ondansetron Injectable 4 milliGRAM(s) IV Push every 6 hours PRN Nausea and/or Vomiting      Allergies    No Known Allergies    Intolerances          Exam:   T(C): 36.9 (03-04-25 @ 09:40), Max: 36.9 (03-04-25 @ 09:40)  HR: 62 (03-04-25 @ 09:40) (62 - 62)  BP: 156/62 (03-04-25 @ 09:40) (156/62 - 156/62)  RR: 12 (03-04-25 @ 09:40) (12 - 12)  SpO2: 99% (03-04-25 @ 09:40) (99% - 99%)  Wt(kg): --  VSS, NAD, A&O x 3  Card: S1/S2, RRR, no m/g/r  Resp: lungs CTA b/l  Abd: S/NT/ND  Groins: hemostatic sutures in place; sites C/D/I; no bleeding, hematoma, erythema, exudate or edema  Ext: no edema; distal pulses intact    I/Os: net +     ECG:  PENDING    Assessment:   79 year old woman with HTN, HLD, obesity, osteoporosis, and Atrial Fibrillation (diagnosed 9/13/24 by PCP) s/p WESTON/DCCV 1/7/2025, with symptomatic improvement post cardioversion.   Now s/p uncomplicated pulsed field ablation of atrial fibrillation (PVI, PWI) via b/l femoral venous access.  Hemostasis achieved       Plan:   Bedrest x 4 hours, then OOB with assistance and progress as tolerated.   Groin sutures to be removed by EP service in AM.   Radial art line to be removed once pt fully awake with stable vitals >1 hour post op.   Pending groin status: Xarelto 20 mg PO *** to resume at *** tonight.   DO NOT HOLD, INTERRUPT OR REVERSE ANTICOAGULATION WITHOUT EXPLICIT APPROVAL FROM EP SERVICE.    Lasix 20mg IV x 1 dose once ambulating, then Lasix 20mg PO daily x 3 days.   Continue ***.    Discontinue ***.   Continue other home medications.   Start Protonix 40mg once daily x 1 month.   Strict I/Os.  Please encourage incentive spirometry and ambulation once able.  Observation and monitoring on telemetry overnight with anticipated discharge in the AM and outpt follow up in 2-4 weeks.  PROCEDURE(S): Pulsed Field Ablation of Atrial Fibrillation    ELECTRPHYSIOLOGIST(S): Venu Srivastava MD         COMPLICATIONS:  none        DISPOSITION:  observation     CONDITION: stable  EBL: <15mL    Pt doing well s/p uncomplicated pulsed field ablation of atrial fibrillation (PVI, PWI) via b/l femoral venous access. Denies complaint.       MEDICATIONS  (STANDING):  amLODIPine   Tablet 10 milliGRAM(s) Oral daily  metoprolol succinate  milliGRAM(s) Oral daily  rivaroxaban 20 milliGRAM(s) Oral with dinner  sodium chloride 0.9%. 1000 milliLiter(s) (75 mL/Hr) IV Continuous <Continuous>    MEDICATIONS  (PRN):  acetaminophen     Tablet .. 650 milliGRAM(s) Oral every 6 hours PRN Mild Pain (1 - 3), Moderate Pain (4 - 6)  ALPRAZolam 0.25 milliGRAM(s) Oral every 6 hours PRN anxiety/insomnia  aluminum hydroxide/magnesium hydroxide/simethicone Suspension 30 milliLiter(s) Oral every 6 hours PRN Dyspepsia  ondansetron Injectable 4 milliGRAM(s) IV Push every 6 hours PRN Nausea and/or Vomiting      Allergies    No Known Allergies    Intolerances          Exam:   T(C): 36.9 (03-04-25 @ 09:40), Max: 36.9 (03-04-25 @ 09:40)  HR: 70 bpm  BP: 121/63  RR: 16  SpO2: 96%    VSS, NAD, A&O x 3  Card: S1/S2, RRR, no m/g/r  Resp: lungs CTA b/l  Abd: S/NT/ND  Groins: hemostatic sutures in place; sites C/D/I; no bleeding, hematoma, erythema, exudate or edema  Ext: no edema; distal pulses intact    I/Os: net + 1400cc    ECG:  Sinus rhythm w/ PACS, normal intervals    Assessment:   79 year old woman with HTN, HLD, obesity, osteoporosis, and Atrial Fibrillation (diagnosed 9/13/24 by PCP) s/p WESTON/DCCV 1/7/2025, with symptomatic improvement post cardioversion.   Now s/p uncomplicated pulsed field ablation of atrial fibrillation (PVI, PWI) via b/l femoral venous access.  Hemostasis achieved via b/l FO8 suture.       Plan:   Bedrest x 4 hours, then OOB with assistance and progress as tolerated.   Groin sutures to be removed by EP service in AM.   Pending groin status: Xarelto 20 mg PO daily to resume at 1900 tonight.   DO NOT HOLD, INTERRUPT OR REVERSE ANTICOAGULATION WITHOUT EXPLICIT APPROVAL FROM EP SERVICE.    Decrease Toprol to 100mg daily  Start gentle hydration with NS @ 75cc/hr overnight    Continue other home medications.   Strict I/Os.  Please encourage incentive spirometry and ambulation once able.  Observation and monitoring on telemetry overnight with anticipated discharge in the AM and outpt follow up in 2-4 weeks.

## 2025-03-04 NOTE — ASU PATIENT PROFILE, ADULT - PATIENT'S GENDER IDENTITY
Name: GLENNA PEREA     Question 1  General Status  Over the last week, have you developed any new or worsening symptoms, such as but not limited to,  shortness of breath, fever, fatigue, nausea, vomiting, or increased pain? If yes please reply 1, if no reply 2, if you're not sure please reply 3, or if you're feeling better reply 4.  New Symptoms      Question 2  Follow Up Completed  Have you completed a follow-up visit with your doctor? Press 1 if you have completed a follow-up, press 2 if you have not scheduled your appointment, press 3 if you missed your appointment, press 4 if you have an appointment scheduled for a follow-up  Appointment Not Scheduled       Required Interventions and Feedback    Call Status:Attempt 1     Clinical Concerns - Issues List:  Respiratory Issues    Comments:Patient reports having same amount of shortness of breath with exertion. Has an appointment on Monday      Clinical Concerns - Actions List:  Encouraged to Follow Up with PCP, Disease Management Education, Encouraged to Follow Up with Speciality Doctor    Comments: CTRN encouraged weighing herself daily and keeping records. Report any new changes to provider.      Follow Up Appointment - Issues List:  Other    Comments:  Appointment is scheduled for Monday     Follow Up Appointment - Actions Taken     Comments: No needs at this time    Female

## 2025-03-05 ENCOUNTER — TRANSCRIPTION ENCOUNTER (OUTPATIENT)
Age: 80
End: 2025-03-05

## 2025-03-05 VITALS
SYSTOLIC BLOOD PRESSURE: 120 MMHG | HEART RATE: 67 BPM | OXYGEN SATURATION: 94 % | TEMPERATURE: 98 F | DIASTOLIC BLOOD PRESSURE: 65 MMHG | RESPIRATION RATE: 18 BRPM

## 2025-03-05 LAB
ALBUMIN SERPL ELPH-MCNC: 3.3 G/DL — SIGNIFICANT CHANGE UP (ref 3.3–5.2)
ALP SERPL-CCNC: 73 U/L — SIGNIFICANT CHANGE UP (ref 40–120)
ALT FLD-CCNC: 16 U/L — SIGNIFICANT CHANGE UP
ANION GAP SERPL CALC-SCNC: 12 MMOL/L — SIGNIFICANT CHANGE UP (ref 5–17)
AST SERPL-CCNC: 71 U/L — HIGH
BILIRUB SERPL-MCNC: 1.1 MG/DL — SIGNIFICANT CHANGE UP (ref 0.4–2)
BUN SERPL-MCNC: 19.2 MG/DL — SIGNIFICANT CHANGE UP (ref 8–20)
CALCIUM SERPL-MCNC: 8.5 MG/DL — SIGNIFICANT CHANGE UP (ref 8.4–10.5)
CHLORIDE SERPL-SCNC: 105 MMOL/L — SIGNIFICANT CHANGE UP (ref 96–108)
CO2 SERPL-SCNC: 22 MMOL/L — SIGNIFICANT CHANGE UP (ref 22–29)
CREAT SERPL-MCNC: 0.64 MG/DL — SIGNIFICANT CHANGE UP (ref 0.5–1.3)
EGFR: 90 ML/MIN/1.73M2 — SIGNIFICANT CHANGE UP
EGFR: 90 ML/MIN/1.73M2 — SIGNIFICANT CHANGE UP
GLUCOSE SERPL-MCNC: 144 MG/DL — HIGH (ref 70–99)
HCT VFR BLD CALC: 37.6 % — SIGNIFICANT CHANGE UP (ref 34.5–45)
HGB BLD-MCNC: 12.2 G/DL — SIGNIFICANT CHANGE UP (ref 11.5–15.5)
MAGNESIUM SERPL-MCNC: 1.9 MG/DL — SIGNIFICANT CHANGE UP (ref 1.6–2.6)
MCHC RBC-ENTMCNC: 30 PG — SIGNIFICANT CHANGE UP (ref 27–34)
MCHC RBC-ENTMCNC: 32.4 G/DL — SIGNIFICANT CHANGE UP (ref 32–36)
MCV RBC AUTO: 92.6 FL — SIGNIFICANT CHANGE UP (ref 80–100)
NRBC # BLD AUTO: 0 K/UL — SIGNIFICANT CHANGE UP (ref 0–0)
NRBC # FLD: 0 K/UL — SIGNIFICANT CHANGE UP (ref 0–0)
NRBC BLD AUTO-RTO: 0 /100 WBCS — SIGNIFICANT CHANGE UP (ref 0–0)
PLATELET # BLD AUTO: 212 K/UL — SIGNIFICANT CHANGE UP (ref 150–400)
PMV BLD: 12.1 FL — SIGNIFICANT CHANGE UP (ref 7–13)
POTASSIUM SERPL-MCNC: 4.1 MMOL/L — SIGNIFICANT CHANGE UP (ref 3.5–5.3)
POTASSIUM SERPL-SCNC: 4.1 MMOL/L — SIGNIFICANT CHANGE UP (ref 3.5–5.3)
PROT SERPL-MCNC: 6 G/DL — LOW (ref 6.6–8.7)
RBC # BLD: 4.06 M/UL — SIGNIFICANT CHANGE UP (ref 3.8–5.2)
RBC # FLD: 13.9 % — SIGNIFICANT CHANGE UP (ref 10.3–14.5)
SODIUM SERPL-SCNC: 139 MMOL/L — SIGNIFICANT CHANGE UP (ref 135–145)
WBC # BLD: 12.72 K/UL — HIGH (ref 3.8–10.5)
WBC # FLD AUTO: 12.72 K/UL — HIGH (ref 3.8–10.5)

## 2025-03-05 PROCEDURE — 93010 ELECTROCARDIOGRAM REPORT: CPT

## 2025-03-05 PROCEDURE — C1766: CPT

## 2025-03-05 PROCEDURE — 83735 ASSAY OF MAGNESIUM: CPT

## 2025-03-05 PROCEDURE — C1894: CPT

## 2025-03-05 PROCEDURE — 80053 COMPREHEN METABOLIC PANEL: CPT

## 2025-03-05 PROCEDURE — C1731: CPT

## 2025-03-05 PROCEDURE — C9399: CPT

## 2025-03-05 PROCEDURE — 93656 COMPRE EP EVAL ABLTJ ATR FIB: CPT

## 2025-03-05 PROCEDURE — 36415 COLL VENOUS BLD VENIPUNCTURE: CPT

## 2025-03-05 PROCEDURE — 85027 COMPLETE CBC AUTOMATED: CPT

## 2025-03-05 PROCEDURE — 93657 TX L/R ATRIAL FIB ADDL: CPT

## 2025-03-05 PROCEDURE — C1733: CPT

## 2025-03-05 PROCEDURE — C1893: CPT

## 2025-03-05 PROCEDURE — C1759: CPT

## 2025-03-05 PROCEDURE — C1732: CPT

## 2025-03-05 PROCEDURE — 93005 ELECTROCARDIOGRAM TRACING: CPT

## 2025-03-05 RX ORDER — METOPROLOL SUCCINATE 50 MG/1
1 TABLET, EXTENDED RELEASE ORAL
Qty: 30 | Refills: 0
Start: 2025-03-05 | End: 2025-04-03

## 2025-03-05 NOTE — PROGRESS NOTE ADULT - REASON FOR ADMISSION
5/16/2024  IJordan DO, saw and evaluated the patient. I have discussed the patient with the resident/non-physician practitioner and agree with the resident's/non-physician practitioner's findings, Plan of Care, and MDM as documented in the resident's/non-physician practitioner's note, except where noted. All available labs and Radiology studies were reviewed.  I was present for key portions of any procedure(s) performed by the resident/non-physician practitioner and I was immediately available to provide assistance.       At this point I agree with the current assessment done in the Emergency Department.  I have conducted an independent evaluation of this patient a history and physical is as follows:    42 yo W/ history of lumbar disc herniation s/p fusion.  Starting Friday he jumped off a concrete block onto floor,  Since then pain gradually worsening.  No numbness/tingling, no weakness, no saddle anesthesia, no bowel/bladder incontinence.  Pain does no radiate.     Physical Exam  Vitals and nursing note reviewed.   Constitutional:       General: He is not in acute distress.     Appearance: He is well-developed.   HENT:      Head: Normocephalic and atraumatic.      Right Ear: External ear normal.      Left Ear: External ear normal.   Eyes:      Conjunctiva/sclera: Conjunctivae normal.      Pupils: Pupils are equal, round, and reactive to light.   Cardiovascular:      Rate and Rhythm: Normal rate and regular rhythm.      Heart sounds: Normal heart sounds. No murmur heard.     No friction rub. No gallop.   Pulmonary:      Effort: Pulmonary effort is normal. No respiratory distress.      Breath sounds: Normal breath sounds. No wheezing or rales.   Abdominal:      General: Bowel sounds are normal. There is no distension.      Palpations: Abdomen is soft.      Tenderness: There is no abdominal tenderness. There is no guarding.   Musculoskeletal:         General: Tenderness (L spine) present. No deformity. 
AF ablation
Normal range of motion.      Cervical back: Normal range of motion.   Lymphadenopathy:      Cervical: No cervical adenopathy.   Skin:     General: Skin is warm and dry.   Neurological:      General: No focal deficit present.      Mental Status: He is alert and oriented to person, place, and time. Mental status is at baseline.      Cranial Nerves: No cranial nerve deficit.      Sensory: No sensory deficit.      Motor: No weakness or abnormal muscle tone.   Psychiatric:         Behavior: Behavior normal.          Plan: CT lumbar spine, patient declined pain medicine.     CT L-spine within normal limits.  Patient discharged home, told to follow-up with family doctor.    ED Course         Critical Care Time  Procedures

## 2025-03-05 NOTE — DISCHARGE NOTE NURSING/CASE MANAGEMENT/SOCIAL WORK - PATIENT PORTAL LINK FT
You can access the FollowMyHealth Patient Portal offered by [facility] by registering at the following website: [email or web address]. By joining [employer]’s FollowMyHealth portal, you will also be able to view your health information using other applications (apps) compatible with our system.

## 2025-03-05 NOTE — PROGRESS NOTE ADULT - SUBJECTIVE AND OBJECTIVE BOX
Pt doing well POD #1 s/p uncomplicated pulsed field ablation of atrial fibrillation (PVI, PWI). b/l groin sutures removed at bedside. Denies complaint.     EKG: SR with intact conduction. APCs  TELE: SR with intact conduction. APCs / PVCs    MEDICATIONS  (STANDING):  amLODIPine   Tablet 10 milliGRAM(s) Oral daily  hydrochlorothiazide 12.5 milliGRAM(s) Oral daily  metoprolol succinate  milliGRAM(s) Oral daily  rivaroxaban 20 milliGRAM(s) Oral with dinner  sodium chloride 0.9%. 1000 milliLiter(s) (75 mL/Hr) IV Continuous <Continuous>    MEDICATIONS  (PRN):  acetaminophen     Tablet .. 650 milliGRAM(s) Oral every 6 hours PRN Mild Pain (1 - 3), Moderate Pain (4 - 6)  ALPRAZolam 0.25 milliGRAM(s) Oral every 6 hours PRN anxiety/insomnia  aluminum hydroxide/magnesium hydroxide/simethicone Suspension 30 milliLiter(s) Oral every 6 hours PRN Dyspepsia  ondansetron Injectable 4 milliGRAM(s) IV Push every 6 hours PRN Nausea and/or Vomiting      Allergies    No Known Allergies    Intolerances      PAST MEDICAL & SURGICAL HISTORY:  HTN (hypertension)      Hyperlipidemia      Atrial fibrillation      Obesity      Osteoporosis      Anxiety      CA skin, basal cell      Status post Mohs surgery          Vital Signs Last 24 Hrs  T(C): 36.6 (05 Mar 2025 08:27), Max: 36.9 (04 Mar 2025 09:40)  T(F): 97.9 (05 Mar 2025 08:27), Max: 98.4 (04 Mar 2025 09:40)  HR: 67 (05 Mar 2025 08:27) (62 - 85)  BP: 120/65 (05 Mar 2025 08:27) (100/69 - 156/62)  BP(mean): 79 (05 Mar 2025 00:09) (79 - 89)  RR: 18 (05 Mar 2025 08:27) (12 - 19)  SpO2: 94% (05 Mar 2025 08:27) (94% - 99%)    Parameters below as of 05 Mar 2025 08:27  Patient On (Oxygen Delivery Method): room air        Physical Exam:  Constitutional: NAD, AAOx3  Cardiovascular: +S1S2 RRR  Pulmonary: CTA b/l, unlabored  Abd: soft NTND +BS  Groins: C/D/I bilaterally; no bleeding, hematoma, edema  Extremities: no pedal edema, +distal pulses b/l  Neuro: non focal, MCKEON x4    LABS:                        12.2   12.72 )-----------( 212      ( 05 Mar 2025 04:46 )             37.6     03-05    139  |  105  |  19.2  ----------------------------<  144[H]  4.1   |  22.0  |  0.64    Ca    8.5      05 Mar 2025 04:46  Mg     1.9     03-05    TPro  6.0[L]  /  Alb  3.3  /  TBili  1.1  /  DBili  x   /  AST  71[H]  /  ALT  16  /  AlkPhos  73  03-05      Urinalysis Basic - ( 05 Mar 2025 04:46 )    Color: x / Appearance: x / SG: x / pH: x  Gluc: 144 mg/dL / Ketone: x  / Bili: x / Urobili: x   Blood: x / Protein: x / Nitrite: x   Leuk Esterase: x / RBC: x / WBC x   Sq Epi: x / Non Sq Epi: x / Bacteria: x        Assessment:   Pt is a 79 year old woman with HTN, HLD, obesity, osteoporosis, and Atrial Fibrillation (diagnosed 9/13/24 by PCP) s/p WESTON/DCCV 1/7/2025, with symptomatic improvement post cardioversion.   Now POD #1 s/p uncomplicated pulsed field ablation of atrial fibrillation (PVI, PWI). b/l groin sutures removed at bedside. Denies complaint.     Plan:   c/w Xarelto 20mg QD  Decrease Toprol to 100mg daily  Pt instructed as activity limitations - no lifting/pushing/pulling >10 lbs or strenuous exercise x 1 week.   Pt instructed as to access site care and f/up - written instructions included in d/c documents.  Outpt f/up in 2-4 weeks - office will contact pt to schedule.

## 2025-03-05 NOTE — DISCHARGE NOTE NURSING/CASE MANAGEMENT/SOCIAL WORK - FINANCIAL ASSISTANCE
[facility] provides services at a reduced cost to those who are determined to be eligible through [facility]’s financial assistance program. Information regarding [facility]’s financial assistance program can be found by going to [email or web address] or by calling [phone].

## 2025-03-08 ENCOUNTER — TRANSCRIPTION ENCOUNTER (OUTPATIENT)
Age: 80
End: 2025-03-08

## 2025-03-29 NOTE — DISCHARGE NOTE NURSING/CASE MANAGEMENT/SOCIAL WORK - NSDCPEFALRISK_GEN_ALL_CORE
Return here to the Er if you are worse at any time  
For information on Fall & Injury Prevention, visit: [email or web address] OR  [email or web address] OR  https://www.cdc.gov/steadi/patient.html

## 2025-04-09 ENCOUNTER — APPOINTMENT (OUTPATIENT)
Dept: ELECTROPHYSIOLOGY | Facility: CLINIC | Age: 80
End: 2025-04-09
Payer: MEDICARE

## 2025-04-09 VITALS
HEIGHT: 68 IN | DIASTOLIC BLOOD PRESSURE: 84 MMHG | BODY MASS INDEX: 33.49 KG/M2 | SYSTOLIC BLOOD PRESSURE: 150 MMHG | WEIGHT: 221 LBS | HEART RATE: 75 BPM

## 2025-04-09 DIAGNOSIS — Z98.890 OTHER SPECIFIED POSTPROCEDURAL STATES: ICD-10-CM

## 2025-04-09 DIAGNOSIS — Z86.79 OTHER SPECIFIED POSTPROCEDURAL STATES: ICD-10-CM

## 2025-04-09 DIAGNOSIS — Z92.89 PERSONAL HISTORY OF OTHER MEDICAL TREATMENT: ICD-10-CM

## 2025-04-09 DIAGNOSIS — I48.0 PAROXYSMAL ATRIAL FIBRILLATION: ICD-10-CM

## 2025-04-09 PROCEDURE — 99215 OFFICE O/P EST HI 40 MIN: CPT

## 2025-04-09 PROCEDURE — 93000 ELECTROCARDIOGRAM COMPLETE: CPT | Mod: 59

## 2025-06-29 ENCOUNTER — NON-APPOINTMENT (OUTPATIENT)
Age: 80
End: 2025-06-29